# Patient Record
Sex: MALE | Race: OTHER | HISPANIC OR LATINO | URBAN - METROPOLITAN AREA
[De-identification: names, ages, dates, MRNs, and addresses within clinical notes are randomized per-mention and may not be internally consistent; named-entity substitution may affect disease eponyms.]

---

## 2019-02-20 ENCOUNTER — HOSPITAL ENCOUNTER (EMERGENCY)
Facility: HOSPITAL | Age: 39
Discharge: HOME | End: 2019-02-20
Attending: EMERGENCY MEDICINE

## 2019-02-20 VITALS
RESPIRATION RATE: 18 BRPM | HEIGHT: 69 IN | BODY MASS INDEX: 26.66 KG/M2 | OXYGEN SATURATION: 99 % | SYSTOLIC BLOOD PRESSURE: 186 MMHG | HEART RATE: 65 BPM | WEIGHT: 180 LBS | DIASTOLIC BLOOD PRESSURE: 110 MMHG | TEMPERATURE: 100 F

## 2019-02-20 DIAGNOSIS — I10 HYPERTENSION: ICD-10-CM

## 2019-02-20 DIAGNOSIS — I10 ASYMPTOMATIC HYPERTENSION: Primary | ICD-10-CM

## 2019-02-20 DIAGNOSIS — Z76.0 ENCOUNTER FOR MEDICATION REFILL: ICD-10-CM

## 2019-02-20 PROCEDURE — 99283 EMERGENCY DEPT VISIT LOW MDM: CPT

## 2019-02-20 PROCEDURE — 93005 ELECTROCARDIOGRAM TRACING: CPT

## 2019-02-20 PROCEDURE — 25000003 PHARM REV CODE 250: Performed by: NURSE PRACTITIONER

## 2019-02-20 RX ORDER — AMLODIPINE BESYLATE 5 MG/1
10 TABLET ORAL
Status: COMPLETED | OUTPATIENT
Start: 2019-02-20 | End: 2019-02-20

## 2019-02-20 RX ORDER — AMLODIPINE BESYLATE 10 MG/1
10 TABLET ORAL DAILY
Qty: 30 TABLET | Refills: 0 | Status: SHIPPED | OUTPATIENT
Start: 2019-02-20

## 2019-02-20 RX ORDER — AMLODIPINE BESYLATE 10 MG/1
10 TABLET ORAL DAILY
COMMUNITY
End: 2019-02-20 | Stop reason: SDUPTHER

## 2019-02-20 RX ADMIN — AMLODIPINE BESYLATE 10 MG: 5 TABLET ORAL at 05:02

## 2019-02-20 NOTE — DISCHARGE INSTRUCTIONS
Have your blood pressure recheck by your doctor within 1 week.  Return to the ED if your condition changes, progresses, or if you have any concerns.

## 2019-02-20 NOTE — ED PROVIDER NOTES
Encounter Date: 2/20/2019       History     Chief Complaint   Patient presents with    Hypertension     Reports hx of HTN, has been out of medications for 2 weeks. /116.      38-year-old male with past medical history of hypertension is here for a refill of his blood pressure medications.  He reports that he has been out of his blood pressure medication for about 2 weeks.  He denies headache, lightheadedness, dizziness, visual changes, chest pain, shortness of breath, and vomiting.  He is unsure which medication he takes.  No other complaints at this time.      The history is provided by the patient.     Review of patient's allergies indicates:  No Known Allergies  Past Medical History:   Diagnosis Date    Hypertension      History reviewed. No pertinent surgical history.  History reviewed. No pertinent family history.  Social History     Tobacco Use    Smoking status: Never Smoker   Substance Use Topics    Alcohol use: No     Frequency: Never    Drug use: No     Review of Systems   Constitutional: Negative for activity change and fever.   HENT: Negative for congestion.    Eyes: Negative for visual disturbance.   Respiratory: Negative for cough, chest tightness and shortness of breath.    Cardiovascular: Negative for chest pain.   Gastrointestinal: Negative for abdominal pain and vomiting.   Musculoskeletal: Negative for back pain.   Skin: Negative.    Neurological: Negative for dizziness, weakness, light-headedness, numbness and headaches.   All other systems reviewed and are negative.      Physical Exam     Initial Vitals [02/20/19 1637]   BP Pulse Resp Temp SpO2   (!) 185/116 76 20 99.5 °F (37.5 °C) 99 %      MAP       --         Physical Exam    Nursing note and vitals reviewed.  Constitutional: He appears well-developed and well-nourished. He is active and cooperative. He is easily aroused.  Non-toxic appearance. He does not have a sickly appearance. He does not appear ill. No distress.   HENT:    Head: Normocephalic and atraumatic.   Eyes: Conjunctivae are normal.   Neck: Normal range of motion.   Cardiovascular: Normal rate, regular rhythm and normal heart sounds.   Pulmonary/Chest: Effort normal and breath sounds normal.   Abdominal: Soft. Normal appearance and bowel sounds are normal. There is no tenderness.   Neurological: He is alert, oriented to person, place, and time and easily aroused. GCS eye subscore is 4. GCS verbal subscore is 5. GCS motor subscore is 6.   Skin: Skin is warm, dry and intact. No rash noted.   Psychiatric: He has a normal mood and affect. His speech is normal and behavior is normal. Judgment and thought content normal. Cognition and memory are normal.         ED Course   Procedures  Labs Reviewed - No data to display       Imaging Results    None          Medical Decision Making:   Initial Assessment:   38-year-old male presents the ED for refill of his blood pressure medications.  He has been out for 2 weeks.  He denies headache, chest pain, shortness of breath, lightheadedness, dizziness, and visual changes.  Patient appears well, nontoxic.  His blood pressure is elevated.  Differential Diagnosis:   Medication refill, hypertensive urgency, dysrhythmia  Clinical Tests:   Medical Tests: Ordered and Reviewed  ED Management:  EKG, Amlodipine  Other:   I have discussed this case with another health care provider.       <> Summary of the Discussion: Reviewed with  who agrees with ED course and disposition.   No indication for labs at this time.  Patient has no associated symptoms of hypertension.  He called his pharmacy and was informed that he takes amlodipine 10 mg daily.  Patient will be given a refill of his blood pressure medications.  Pt to follow up with PCP within 1 week.  Advised BP recheck within two weeks by PCP.  I reviewed strict return precautions. In addition, pt is to return to the ED if condition changes, progresses, or if there are any concerns.  Pt  verbalized understanding, compliance, and agreement with the treatment plan.    RX Amlodipine                      Clinical Impression:       ICD-10-CM ICD-9-CM   1. Asymptomatic hypertension I10 401.9   2. Hypertension I10 401.9   3. Encounter for medication refill Z76.0 V68.1                                Kandy Meredith, DAVE  02/20/19 1757

## 2019-02-20 NOTE — ED NOTES
Pt to Room 25 with c/o hypertension. Pt states he is normally on blood pressure medication but has not taken his medications for 3 weeks. Pt states he checked his blood pressure last night and noticed it was elevated. Pt denies any nausea, vomiting, diarrhea, constipation, dizziness, weakness, chest pain, SOB, or headache. Pt denies any symptoms of hypertension. Pt is AAO x 3. Respirations even and unlabored, lung sounds clear and equal bilaterally. Skin warm and dry to touch, no swelling noted. Abdomen soft and non-distended, no guarding or tenderness noted. No acute distress noted. Will continue to monitor closely.